# Patient Record
Sex: MALE | Race: WHITE
[De-identification: names, ages, dates, MRNs, and addresses within clinical notes are randomized per-mention and may not be internally consistent; named-entity substitution may affect disease eponyms.]

---

## 2017-08-21 ENCOUNTER — HOSPITAL ENCOUNTER (OUTPATIENT)
Dept: HOSPITAL 62 - END | Age: 47
Discharge: HOME | End: 2017-08-21
Attending: INTERNAL MEDICINE
Payer: COMMERCIAL

## 2017-08-21 VITALS — SYSTOLIC BLOOD PRESSURE: 104 MMHG | DIASTOLIC BLOOD PRESSURE: 71 MMHG

## 2017-08-21 DIAGNOSIS — K21.9: ICD-10-CM

## 2017-08-21 DIAGNOSIS — K29.50: ICD-10-CM

## 2017-08-21 DIAGNOSIS — K22.70: Primary | ICD-10-CM

## 2017-08-21 DIAGNOSIS — K52.9: ICD-10-CM

## 2017-08-21 DIAGNOSIS — K29.80: ICD-10-CM

## 2017-08-21 DIAGNOSIS — Z88.0: ICD-10-CM

## 2017-08-21 DIAGNOSIS — Z86.010: ICD-10-CM

## 2017-08-21 DIAGNOSIS — Z79.899: ICD-10-CM

## 2017-08-21 DIAGNOSIS — F41.9: ICD-10-CM

## 2017-08-21 PROCEDURE — 0DB68ZX EXCISION OF STOMACH, VIA NATURAL OR ARTIFICIAL OPENING ENDOSCOPIC, DIAGNOSTIC: ICD-10-PCS | Performed by: INTERNAL MEDICINE

## 2017-08-21 PROCEDURE — 0DB58ZX EXCISION OF ESOPHAGUS, VIA NATURAL OR ARTIFICIAL OPENING ENDOSCOPIC, DIAGNOSTIC: ICD-10-PCS | Performed by: INTERNAL MEDICINE

## 2017-08-21 PROCEDURE — 45380 COLONOSCOPY AND BIOPSY: CPT

## 2017-08-21 PROCEDURE — 43239 EGD BIOPSY SINGLE/MULTIPLE: CPT

## 2017-08-21 PROCEDURE — 0DBB8ZX EXCISION OF ILEUM, VIA NATURAL OR ARTIFICIAL OPENING ENDOSCOPIC, DIAGNOSTIC: ICD-10-PCS | Performed by: INTERNAL MEDICINE

## 2017-08-21 PROCEDURE — 0DB98ZX EXCISION OF DUODENUM, VIA NATURAL OR ARTIFICIAL OPENING ENDOSCOPIC, DIAGNOSTIC: ICD-10-PCS | Performed by: INTERNAL MEDICINE

## 2017-08-21 PROCEDURE — 88342 IMHCHEM/IMCYTCHM 1ST ANTB: CPT

## 2017-08-21 PROCEDURE — 88305 TISSUE EXAM BY PATHOLOGIST: CPT

## 2017-08-21 PROCEDURE — 0DBF8ZX EXCISION OF RIGHT LARGE INTESTINE, VIA NATURAL OR ARTIFICIAL OPENING ENDOSCOPIC, DIAGNOSTIC: ICD-10-PCS | Performed by: INTERNAL MEDICINE

## 2017-08-21 NOTE — OPERATIVE REPORT
Operative Report


DATE OF SURGERY: 08/21/17


Operative Report: 





The risks, benefits and alternatives of the procedure including risks of 

bleeding, perforation requiring surgery are explained to the patient in detail 

and informed consent was obtained.  Patient was taken to the endoscopy suite 

and placed in the left, lateral decubital position.  Timeout was called.  

Propofol medications administered.  A rectal examination was done which did not 

reveal any masses, tears or fissures.  An Olympus videoscope was inserted into 

the patient's rectum.  The scope was then carefully advanced all the way to the 

cecum.  The cecum was identified by the usual anatomical landmarks including 

the ileocecal valve as well as the appendiceal office.  Photodocumentation is 

obtained.  The scope was then sequentially pulled back via the various segments 

of the colon including the ascending colon, hepatic flexure, transverse colon, 

splenic flexure, descending colon and finding to the rectosigmoid portions of 

the colon.  Retroflexion maneuvers performed.  Prep is good.


PREOPERATIVE DIAGNOSIS: Change of bowel habits.  Personal history of polyps.  

Dyspepsia


POSTOPERATIVE DIAGNOSIS: Terminal ileitis status post biopsy.  Random biopsies 

taken to rule out for collagenous colitis.  Esophagitis, biopsies obtained to 

rule out Martinez's.  Gastritis status post biopsy rule out Helicobacter pylori.

  Duodenitis status post biopsy rule out celiac disease


OPERATION: Colonoscopy with biopsy.  EGD with biopsy


SURGEON: SAMUEL BLANK


ANESTHESIA: LMAC


TISSUE REMOVED OR ALTERED: As described above.


COMPLICATIONS: 





None.


ESTIMATED BLOOD LOSS: None.


INTRAOPERATIVE FINDINGS: As described above.


PROCEDURE: 





Patient tolerated procedure well.


No immediate postprocedure complications are noted.


Patient discharged in good condition.


Discharge date 8/21/2017.


Discharge diet: Regular.


Discharge activity: Regular.


2-3 week follow-up to discuss findings.


Patient is instructed to call the office or proceed to the emergency room 

should there be any further problems or questions.


10 year surveillance colonoscopy.


We will wait on pathology.

## 2018-01-22 ENCOUNTER — HOSPITAL ENCOUNTER (OUTPATIENT)
Dept: HOSPITAL 62 - END | Age: 48
Discharge: HOME | End: 2018-01-22
Attending: INTERNAL MEDICINE
Payer: COMMERCIAL

## 2018-01-22 VITALS — DIASTOLIC BLOOD PRESSURE: 75 MMHG | SYSTOLIC BLOOD PRESSURE: 121 MMHG

## 2018-01-22 DIAGNOSIS — K29.70: ICD-10-CM

## 2018-01-22 DIAGNOSIS — K22.719: Primary | ICD-10-CM

## 2018-01-22 DIAGNOSIS — Z88.0: ICD-10-CM

## 2018-01-22 DIAGNOSIS — Z87.891: ICD-10-CM

## 2018-01-22 DIAGNOSIS — K21.9: ICD-10-CM

## 2018-01-22 PROCEDURE — 0DB68ZX EXCISION OF STOMACH, VIA NATURAL OR ARTIFICIAL OPENING ENDOSCOPIC, DIAGNOSTIC: ICD-10-PCS | Performed by: INTERNAL MEDICINE

## 2018-01-22 PROCEDURE — 43239 EGD BIOPSY SINGLE/MULTIPLE: CPT

## 2018-01-22 PROCEDURE — 0D558ZZ DESTRUCTION OF ESOPHAGUS, VIA NATURAL OR ARTIFICIAL OPENING ENDOSCOPIC: ICD-10-PCS | Performed by: INTERNAL MEDICINE

## 2018-01-22 PROCEDURE — 43270 EGD LESION ABLATION: CPT

## 2018-01-22 PROCEDURE — 88305 TISSUE EXAM BY PATHOLOGIST: CPT

## 2018-01-22 NOTE — OPERATIVE REPORT
Operative Report


DATE OF SURGERY: 01/22/18


Operative Report: 





The risks benefits and alternatives of the procedure explained to the patient 

in detail and informed consent is obtained.A GIF Olympus video scope was 

inserted into the patient's mouth and hypopharynx, the esophagus is identified 

intubated and insufflated, the scope was then advanced through the esophagus 

stomach and duodenum, retroflexion maneuver is done, the esophagus stomach and 

first and second portions of the duodenum examined


PREOPERATIVE DIAGNOSIS: Personal history of Martinez's esophagus


POSTOPERATIVE DIAGNOSIS: Martinez's esophagus noted status post ablation.  

Gastritis status post biopsy rule out Helicobacter pylori


OPERATION: EGD plus ablation


SURGEON: SAMUEL BLANK


ANESTHESIA: LMAC


TISSUE REMOVED OR ALTERED: As noted above.


COMPLICATIONS: 





None.


ESTIMATED BLOOD LOSS: None.


INTRAOPERATIVE FINDINGS: As described above.


PROCEDURE: 





Patient tolerated procedure well.


No immediate postprocedure complications are noted.


Patient discharged in good condition.


Discharge date 1/22/2018.


Discharge diet: Regular.


Discharge activity: Regular.


2-3 week follow-up to discuss findings.


3 months surveillance EGD with repeat ablation if needed.


Patient is instructed to call the office or proceed to the emergency room 

should there be any further problems or questions.

## 2018-02-22 ENCOUNTER — HOSPITAL ENCOUNTER (OUTPATIENT)
Dept: HOSPITAL 62 - OD | Age: 48
End: 2018-02-22
Attending: INTERNAL MEDICINE
Payer: COMMERCIAL

## 2018-02-22 DIAGNOSIS — R94.2: ICD-10-CM

## 2018-02-22 DIAGNOSIS — I73.00: Primary | ICD-10-CM

## 2018-02-22 PROCEDURE — 36415 COLL VENOUS BLD VENIPUNCTURE: CPT

## 2018-02-22 PROCEDURE — 86225 DNA ANTIBODY NATIVE: CPT

## 2018-02-22 PROCEDURE — 86021 WBC ANTIBODY IDENTIFICATION: CPT

## 2018-02-22 PROCEDURE — 86235 NUCLEAR ANTIGEN ANTIBODY: CPT

## 2018-02-23 LAB
ANTICHROMATIN AB: 0.2 AI (ref 0–0.9)
ATYPICAL PANCA: (no result) TITER
CENTROMERE B AB: 1.1 AI (ref 0–0.9)
CYTOPLASMIC (C-ANCA): (no result) TITER
DNA DOUBLE STRAND ANTIBODY ANA: 8 IU/ML (ref 0–9)
ENA JO1 AB SER-ACNC: <0.2 AI (ref 0–0.9)
PERINUCLEAR (P-ANCA): (no result) TITER
RNP AB: <0.2 AI (ref 0–0.9)
SCLERODERMA-70 ANTIBODIES: <0.2 AI (ref 0–0.9)
SJOGREN'S ANTI-SS-B AB: <0.2 AI (ref 0–0.9)
SJOGREN'S SS-A ANTIBODY: 0.2 AI (ref 0–0.9)
SMITH AB ANA: <0.2 AI (ref 0–0.9)

## 2018-02-24 LAB — ACTIN (SMOOTH MUSCLE) ANTIBODY: 33 UNITS (ref 0–19)

## 2018-06-19 ENCOUNTER — HOSPITAL ENCOUNTER (OUTPATIENT)
Dept: HOSPITAL 62 - RAD | Age: 48
End: 2018-06-19
Attending: INTERNAL MEDICINE
Payer: COMMERCIAL

## 2018-06-19 DIAGNOSIS — K22.70: Primary | ICD-10-CM

## 2018-06-19 PROCEDURE — 74230 X-RAY XM SWLNG FUNCJ C+: CPT

## 2018-06-19 NOTE — RADIOLOGY REPORT (SQ)
EXAM DESCRIPTION:  COOKIE SWALLOW



COMPLETED DATE/TIME:  6/19/2018 8:35 am



REASON FOR STUDY:  ALONSO'S ESOPHAGUS W/O DYSPLASIA (K22.70) K22.70  ALONSO'S ESOPHAGUS WITHOUT DYS
PLASIA



COMPARISON:  None.



TECHNIQUE:  Videofluoroscopic swallowing examination was performed in conjunction with speech patholo
gy.  Videofluoroscopic imaging was obtained and reviewed and these are the findings:



RADIATION DOSE:  Fluoro time 1.0 minutes

1 images saved to PACS.



LIMITATIONS:  None



FINDINGS:  The patient was brought into the fluoro room and placed upright on a modified barium swall
ow chair.  The patient was then given multiple consistencies mixed with barium to swallow under live 
fluoroscopic video guidance.  According to the Speech Pathologist there was no penetration or aspirat
ion. Please refer to the speech pathology report for further details.



IMPRESSION:  NO EVIDENCE OF PENETRATION OR ASPIRATIONPLEASE SEE SPEECH PATHOLOGIST REPORT FOR OTHER F
INDINGS AND RECOMMENDATIONS.



COMMENT:  None

Quality :  Final reports for procedures using fluoroscopy that document radiation exposure darby
antoinette, or exposure time and number of fluorographic images (if radiation exposure indices are not avail
able)



TECHNICAL DOCUMENTATION:  JOB ID: [id number]

 2011 [employer]- All Rights Reserved



Reading location - IP/workstation name: [facility]

## 2018-06-19 NOTE — ST MODIFIED BARIUM SWALLOW
Recommendation





- Recommendations


Recommendations: Recommend patient follow up with gastroenterologist. Reflux 

precautions taught; no other swallowing recommendations.





Medical Diagnoses





- Medical Diagnoses


Medical Diagnosis Description & ICD-10 Code(s): Barrets esophagus K22.7, 

dysphagia R13.10


Other Medical Diagnoses/Co-Morbidities: Per patient report, potential 

scleroderma, and reduced lung capacity.





ST Modified Barium Swallow





- General


Date: 06/19/18


Referring Physician: Dr. Mo 


Risks/Precautions: None


Date of Onset: 06/19/18 - Patient did not report onset date


Reason for Referral: Globus





- History


History obtained from: Patient


-: Medical - Patient reports history of reflux, Barrets esophagus, reduced lung 

capacity and potential scleroderma. Patient reports no discomfort or difficulty 

with swallowing other than esophageal concerns.


Medications: Per patient reprot: zantac, testosterone cream


Allergies: Per patient report: penecilin, wheat/gluten sensitivity





- Functional Status


Prior Functional Status: INDEPENDENT: feeding - Independent


Current Functional Limitations: feeding - Independent





- Subjective


Patient/caregiver goal(s): safe swallow


Cognitive-Linguistic Function: WNL


Speech Intelligibility: WNL


Current Nutritional Means: PO


Current PO diet: Regular


Current symptoms: c/o Globus sensation - Patient indicated globus sensation in 

the sternal region


Pain: Patient reports, denies pain





- Objective


Assessment: Upright, Left Lateral





- Food Trials Used


Food trials used: Thin liquids, Pureed, Regular


The patient: Was Able to Self Feed, via cup, via spoon





- Oral-Motor Skills


Dentition: Full


Velo-pharyngeal function: Unremarkable





- Assessment


Oral prep: Normal


Labial closure: Adequate


Leakage: None


Mastication: Adequate


Lingual Movement: Normal


Oral stage: Normal for this Procedure





- Pharyngeal Stage


Initiation of Pharyngeal Stage Reflex: Normal


Decreased laryngeal elevation: No


Reduced Velopharyngeal Closure: no


Reduced pressure generation: No


reduced tongue-based retraction: No


Pre-swallow pooling in valleculae: None


Pre-Swallow pooling in pyriforms: None


Reduced Thyro-Hyoid approximation: No


Reduced epiglottic excursion: No


Reduced pharyngeal peristalsis/contraction: No


Post-swallow residulas vallecular: None


Post-Swallow residuals in pyriforms: None


Post-Swallow Residuals: no residuals


Reduced Cricopharyngeal opening: No





- Fall Risk Assessment


Medications/Conditions that increase fall risks include: Antidepressants, 

sedatives, anti-arrhythmic, diuretic, benzodiazipenes, neuroleptics.  BP 

regulation problems, cardiac problems, balance or gait deficits, neurological 

problems.


Fall Risk Actions Taken: No action needed





- Behavioral Observations


During evaluation process patient: was pleasant, was cooperative, able to 

answer questions, provided medical history





- Treatment / Educational Needs:


Treatment/Education Needs: Treatment consisted of patient education on the role 

of the Speech Pathologist.  Patient's plan of care and golas were communicated 

as well as scheduling and attendance policies.  Recommendations for initial 

home program were shared.  Patient demonstrated understanding and verbalized 

agreement.





- Impression/Summary


Laryngeal Penetration: No


Tracheal Aspiration: no


Patient presents with: Normal swallow at eval


Risk of Aspiration: Minimal


Risk of nutritional compromise: None


Evaluation and Findings: Clinician presented po trials of thin, pureed and 

regular consistencies. No aspiration noted; adequate hyolaryngeal excursion, 

and epiglottic inversion. No residuals noted. Functional swallow seen on all 

trials.





- Recommendations


Pt/Family education and followup with MD: Yes


Dysphagia therapy with SLP: no


Reflux Precautions: Taught to Patient


Information, Precautions and Recommendations: Patient (Written), Patient (Verbal

)





- Time


Total Time: 20





- Plan of Care


Strategies to optimize patient understanding include:: ongoing assessment of 

educational needs, implementation of educational strategies, and re-education.





- -


-: Thank you for the opportunity to work with this patient and his/her family.  

Should you have any questions about this patient's plan or progress, I can be 

reached at [phone].





Charge G Code?





- -


-: No